# Patient Record
Sex: MALE | Race: WHITE | NOT HISPANIC OR LATINO | Employment: FULL TIME | URBAN - METROPOLITAN AREA
[De-identification: names, ages, dates, MRNs, and addresses within clinical notes are randomized per-mention and may not be internally consistent; named-entity substitution may affect disease eponyms.]

---

## 2024-01-03 ENCOUNTER — HOSPITAL ENCOUNTER (OUTPATIENT)
Facility: MEDICAL CENTER | Age: 59
End: 2024-01-03
Attending: NURSE PRACTITIONER
Payer: COMMERCIAL

## 2024-01-03 ENCOUNTER — OFFICE VISIT (OUTPATIENT)
Dept: URGENT CARE | Facility: CLINIC | Age: 59
End: 2024-01-03
Payer: COMMERCIAL

## 2024-01-03 VITALS
WEIGHT: 200.9 LBS | BODY MASS INDEX: 26.63 KG/M2 | HEART RATE: 68 BPM | RESPIRATION RATE: 16 BRPM | DIASTOLIC BLOOD PRESSURE: 76 MMHG | TEMPERATURE: 97.4 F | SYSTOLIC BLOOD PRESSURE: 110 MMHG | HEIGHT: 73 IN | OXYGEN SATURATION: 98 %

## 2024-01-03 DIAGNOSIS — S80.862A TICK BITE OF LEFT LOWER LEG, INITIAL ENCOUNTER: ICD-10-CM
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
## 2024-01-03 DIAGNOSIS — Z48.02 VISIT FOR SUTURE REMOVAL: ICD-10-CM

## 2024-01-03 LAB
FORWARD REASON: SPWHY: NORMAL
FORWARDED TO LAB: SPWHR: NORMAL
SPECIMEN SENT: SPWT1: NORMAL

## 2024-01-03 PROCEDURE — 99204 OFFICE O/P NEW MOD 45 MIN: CPT | Performed by: NURSE PRACTITIONER

## 2024-01-03 PROCEDURE — 3074F SYST BP LT 130 MM HG: CPT | Performed by: NURSE PRACTITIONER

## 2024-01-03 PROCEDURE — 3078F DIAST BP <80 MM HG: CPT | Performed by: NURSE PRACTITIONER

## 2024-01-03 RX ORDER — DOXYCYCLINE HYCLATE 100 MG
200 TABLET ORAL ONCE
Qty: 2 TABLET | Refills: 0 | Status: SHIPPED | OUTPATIENT
Start: 2024-01-03 | End: 2024-01-03 | Stop reason: SDUPTHER

## 2024-01-03 RX ORDER — DOXYCYCLINE HYCLATE 100 MG
200 TABLET ORAL ONCE
Qty: 2 TABLET | Refills: 0 | Status: SHIPPED | OUTPATIENT
Start: 2024-01-03 | End: 2024-01-03

## 2024-01-04 ASSESSMENT — ENCOUNTER SYMPTOMS
FEVER: 0
CHILLS: 0

## 2024-01-05 NOTE — PROGRESS NOTES
"Subjective:   Barry Schwab is a 58 y.o. male who presents for Tick Removal (On (L) back of calf X yesterday ) and Suture / Staple Removal (Suture removal back of neck (10)  and (L) side of back (3))      HPI  Patient is a 58-year-old male presents urgent care for evaluation of a tick on his left lower calf that he noticed today.  Patient states that he was hiking yesterday he is traveling from [place].  Patient was recently seen by his dermatologist 2 weeks to have a neoplasm removed at this appointment it was noted he had another tick on his left back which the dermatologist did remove at this time.  He has sutures where the neoplasm was removed in his upper back and sutures where the tick was removed on his mid back.  Patient needs the sutures taken out on today's exam.  Patient has not tried to attempt to remove the tick on his left lower calf.  Has some pain.  He was treated with antibiotics when the last tick removed.  Tick did come back negative for Lyme's disease.  Dermatologist is requesting to have tick evaluated after removal.  Patient denies any fevers.  No body aches.  Review of Systems   Constitutional:  Negative for chills and fever.   Skin:  Positive for rash.        Sutures intact.  Tick left posterior calf       Medications:    This patient does not have an active medication from one of the medication groupers.    Allergies: Patient has no known allergies.    Problem List: Barry Schwab does not have a problem list on file.    Surgical History:  No past surgical history on file.    Past Social Hx: Barry Schwab       Past Family Hx:  Barry Schwab family history is not on file.     Problem list, medications, and allergies reviewed by myself today in Epic.     Objective:     /76 (BP Location: Left arm, Patient Position: Sitting, BP Cuff Size: Adult)   Pulse 68   Temp 36.3 °C (97.4 °F) (Temporal)   Resp 16   Ht 1.854 m (6' 1\")   Wt 91.1 kg (200 lb 14.4 oz)   " SpO2 98%   BMI 26.51 kg/m²     Physical Exam  Constitutional:       Appearance: Normal appearance. He is not ill-appearing or toxic-appearing.   HENT:      Head: Normocephalic.      Right Ear: External ear normal.      Left Ear: External ear normal.      Nose: Nose normal.      Mouth/Throat:      Lips: Pink.   Eyes:      General: Lids are normal.   Pulmonary:      Effort: Pulmonary effort is normal. No accessory muscle usage.   Musculoskeletal:      Cervical back: Full passive range of motion without pain.   Skin:            Comments: Running stitches noted in the upper thoracic aspect 3 stitches mid thoracic without erythema.   Visible tick body left posterior lower leg ecchymotic ring noted surrounding tick   Neurological:      Mental Status: He is alert and oriented to person, place, and time.   Psychiatric:         Mood and Affect: Mood normal.         Thought Content: Thought content normal.         Assessment/Plan:     Diagnosis and associated orders:     1. Tick bite of left lower leg, initial encounter  doxycycline (VIBRAMYCIN) 100 MG Tab    SPECIMEN FORWARDED    DISCONTINUED: doxycycline (VIBRAMYCIN) 100 MG Tab      2. Visit for suture removal           Comments/MDM:     I personally reviewed prior external notes and prior test results pertinent to today's visit.  Was able to successfully remove the tick with forceps.  Initially head did not come up with the body however was able to fully extract with forceps..  Tick was sent to lab for evaluation.  Patient will be treated with doxycycline x 1 dose.  Follow-up with dermatology for further evaluation.  Sutures removed on today's exam no signs of infection.  Discussed management options, risks and benefits, and alternatives to treatment plan agreed upon.   Red flags discussed and indications to immediately call 911 or present to the Emergency Department.   Supportive care, differential diagnoses, and indications for immediate follow-up discussed with  patient.    Patient expresses understanding and agrees to plan. Patient denies any other questions or concerns.              Please note that this dictation was created using voice recognition software. I have made a reasonable attempt to correct obvious errors, but I expect that there are errors of grammar and possibly content that I did not discover before finalizing the note.    This note was electronically signed by Miguel A SIMON.